# Patient Record
Sex: MALE | Race: WHITE | NOT HISPANIC OR LATINO | Employment: OTHER | URBAN - METROPOLITAN AREA
[De-identification: names, ages, dates, MRNs, and addresses within clinical notes are randomized per-mention and may not be internally consistent; named-entity substitution may affect disease eponyms.]

---

## 2017-02-03 ENCOUNTER — LAB REQUISITION (OUTPATIENT)
Dept: LAB | Facility: HOSPITAL | Age: 77
End: 2017-02-03
Payer: COMMERCIAL

## 2017-02-03 DIAGNOSIS — D64.9 ANEMIA: ICD-10-CM

## 2017-02-03 LAB — HEMOCCULT STL QL IA: NEGATIVE

## 2017-02-03 PROCEDURE — G0328 FECAL BLOOD SCRN IMMUNOASSAY: HCPCS | Performed by: FAMILY MEDICINE

## 2020-10-12 ENCOUNTER — APPOINTMENT (OUTPATIENT)
Dept: RADIOLOGY | Age: 80
End: 2020-10-12
Payer: COMMERCIAL

## 2020-10-12 ENCOUNTER — TRANSCRIBE ORDERS (OUTPATIENT)
Dept: ADMINISTRATIVE | Age: 80
End: 2020-10-12

## 2020-10-12 DIAGNOSIS — J01.90 ACUTE SINUSITIS, RECURRENCE NOT SPECIFIED, UNSPECIFIED LOCATION: ICD-10-CM

## 2020-10-12 DIAGNOSIS — J01.90 ACUTE SINUSITIS, RECURRENCE NOT SPECIFIED, UNSPECIFIED LOCATION: Primary | ICD-10-CM

## 2020-10-12 PROCEDURE — 70210 X-RAY EXAM OF SINUSES: CPT

## 2021-03-31 ENCOUNTER — TRANSCRIBE ORDERS (OUTPATIENT)
Dept: ADMINISTRATIVE | Age: 81
End: 2021-03-31

## 2021-03-31 ENCOUNTER — APPOINTMENT (OUTPATIENT)
Dept: RADIOLOGY | Age: 81
End: 2021-03-31
Payer: COMMERCIAL

## 2021-03-31 DIAGNOSIS — R05.9 COUGH: ICD-10-CM

## 2021-03-31 DIAGNOSIS — R05.9 COUGH: Primary | ICD-10-CM

## 2021-03-31 PROCEDURE — 71046 X-RAY EXAM CHEST 2 VIEWS: CPT

## 2021-07-12 ENCOUNTER — APPOINTMENT (OUTPATIENT)
Dept: RADIOLOGY | Age: 81
End: 2021-07-12
Payer: COMMERCIAL

## 2021-07-12 DIAGNOSIS — J45.40 MODERATE PERSISTENT ASTHMA, UNSPECIFIED WHETHER COMPLICATED: ICD-10-CM

## 2021-07-12 PROCEDURE — 71046 X-RAY EXAM CHEST 2 VIEWS: CPT

## 2023-01-27 ENCOUNTER — PROBLEM (OUTPATIENT)
Dept: URBAN - METROPOLITAN AREA CLINIC 6 | Facility: CLINIC | Age: 83
End: 2023-01-27

## 2023-01-27 DIAGNOSIS — G45.3: ICD-10-CM

## 2023-01-27 DIAGNOSIS — H26.491: ICD-10-CM

## 2023-01-27 DIAGNOSIS — R73.09: ICD-10-CM

## 2023-01-27 DIAGNOSIS — Z96.1: ICD-10-CM

## 2023-01-27 DIAGNOSIS — H43.813: ICD-10-CM

## 2023-01-27 PROCEDURE — 92250 FUNDUS PHOTOGRAPHY W/I&R: CPT

## 2023-01-27 PROCEDURE — 92014 COMPRE OPH EXAM EST PT 1/>: CPT

## 2023-01-27 ASSESSMENT — VISUAL ACUITY
OD_CC: 20/20
OU_CC: J1
OS_CC: 20/20

## 2023-01-27 ASSESSMENT — TONOMETRY
OS_IOP_MMHG: 9
OD_IOP_MMHG: 9

## 2023-04-20 ENCOUNTER — ESTABLISHED COMPREHENSIVE EXAM (OUTPATIENT)
Dept: URBAN - METROPOLITAN AREA CLINIC 6 | Facility: CLINIC | Age: 83
End: 2023-04-20

## 2023-04-20 DIAGNOSIS — Z96.1: ICD-10-CM

## 2023-04-20 DIAGNOSIS — H26.491: ICD-10-CM

## 2023-04-20 DIAGNOSIS — H43.813: ICD-10-CM

## 2023-04-20 DIAGNOSIS — R73.09: ICD-10-CM

## 2023-04-20 DIAGNOSIS — G45.3: ICD-10-CM

## 2023-04-20 PROCEDURE — 92014 COMPRE OPH EXAM EST PT 1/>: CPT

## 2023-04-20 ASSESSMENT — TONOMETRY
OD_IOP_MMHG: 8
OS_IOP_MMHG: 9

## 2023-04-20 ASSESSMENT — VISUAL ACUITY
OD_CC: 20/25-2
OS_CC: 20/25-2

## 2023-04-26 ENCOUNTER — PREP FOR PROCEDURE (OUTPATIENT)
Age: 83
End: 2023-04-26

## 2023-04-26 DIAGNOSIS — Z86.010 PERSONAL HISTORY OF COLONIC POLYPS: Primary | ICD-10-CM

## 2023-05-24 ENCOUNTER — ANESTHESIA (OUTPATIENT)
Dept: GASTROENTEROLOGY | Facility: HOSPITAL | Age: 83
End: 2023-05-24

## 2023-05-24 ENCOUNTER — ANESTHESIA EVENT (OUTPATIENT)
Dept: GASTROENTEROLOGY | Facility: HOSPITAL | Age: 83
End: 2023-05-24

## 2023-05-24 ENCOUNTER — HOSPITAL ENCOUNTER (OUTPATIENT)
Dept: GASTROENTEROLOGY | Facility: HOSPITAL | Age: 83
Setting detail: OUTPATIENT SURGERY
Discharge: HOME | End: 2023-05-24
Attending: COLON & RECTAL SURGERY

## 2023-05-24 VITALS
WEIGHT: 230 LBS | HEIGHT: 76 IN | DIASTOLIC BLOOD PRESSURE: 66 MMHG | TEMPERATURE: 96.4 F | HEART RATE: 70 BPM | RESPIRATION RATE: 16 BRPM | SYSTOLIC BLOOD PRESSURE: 138 MMHG | OXYGEN SATURATION: 97 % | BODY MASS INDEX: 28.01 KG/M2

## 2023-05-24 DIAGNOSIS — Z86.010 PERSONAL HISTORY OF COLONIC POLYPS: ICD-10-CM

## 2023-05-24 RX ORDER — FLUTICASONE FUROATE AND VILANTEROL 200; 25 UG/1; UG/1
1 POWDER RESPIRATORY (INHALATION) DAILY
COMMUNITY
Start: 2023-03-13

## 2023-05-24 RX ORDER — PROPOFOL 10 MG/ML
INJECTION, EMULSION INTRAVENOUS AS NEEDED
Status: DISCONTINUED | OUTPATIENT
Start: 2023-05-24 | End: 2023-05-24

## 2023-05-24 RX ORDER — SODIUM CHLORIDE 9 MG/ML
INJECTION, SOLUTION INTRAVENOUS CONTINUOUS PRN
Status: DISCONTINUED | OUTPATIENT
Start: 2023-05-24 | End: 2023-05-24

## 2023-05-24 RX ORDER — CARVEDILOL 6.25 MG/1
1 TABLET ORAL 2 TIMES DAILY WITH MEALS
COMMUNITY
Start: 2023-01-13 | End: 2024-01-13

## 2023-05-24 RX ORDER — FOLIC ACID 1 MG/1
5 TABLET ORAL
COMMUNITY
Start: 2023-01-31

## 2023-05-24 RX ORDER — ONDANSETRON 2 MG/ML
INJECTION INTRAMUSCULAR; INTRAVENOUS AS NEEDED
Status: DISCONTINUED | OUTPATIENT
Start: 2023-05-24 | End: 2023-05-24

## 2023-05-24 RX ORDER — MONTELUKAST SODIUM 10 MG/1
10 TABLET ORAL
COMMUNITY

## 2023-05-24 RX ORDER — SIMVASTATIN 40 MG
20 TABLET ORAL
COMMUNITY

## 2023-05-24 RX ORDER — LEVOTHYROXINE SODIUM 88 UG/1
88 TABLET ORAL
COMMUNITY
Start: 2022-07-31 | End: 2023-07-31

## 2023-05-24 RX ORDER — PANTOPRAZOLE SODIUM 40 MG/1
40 TABLET, DELAYED RELEASE ORAL
COMMUNITY
Start: 2022-09-25 | End: 2023-09-25

## 2023-05-24 RX ORDER — LISINOPRIL 5 MG/1
5 TABLET ORAL DAILY
COMMUNITY
Start: 2023-01-23

## 2023-05-24 RX ORDER — ACETAMINOPHEN 500 MG
500 TABLET ORAL
COMMUNITY

## 2023-05-24 RX ORDER — FUROSEMIDE 20 MG/1
40 TABLET ORAL DAILY
COMMUNITY
Start: 2023-01-23

## 2023-05-24 RX ADMIN — SODIUM CHLORIDE: 0.9 INJECTION, SOLUTION INTRAVENOUS at 11:25

## 2023-05-24 RX ADMIN — PROPOFOL 20 MG: 10 INJECTION, EMULSION INTRAVENOUS at 11:13

## 2023-05-24 RX ADMIN — PROPOFOL 20 MG: 10 INJECTION, EMULSION INTRAVENOUS at 11:06

## 2023-05-24 RX ADMIN — PROPOFOL 20 MG: 10 INJECTION, EMULSION INTRAVENOUS at 11:11

## 2023-05-24 RX ADMIN — PROPOFOL 20 MG: 10 INJECTION, EMULSION INTRAVENOUS at 11:15

## 2023-05-24 RX ADMIN — PROPOFOL 20 MG: 10 INJECTION, EMULSION INTRAVENOUS at 11:21

## 2023-05-24 RX ADMIN — PROPOFOL 20 MG: 10 INJECTION, EMULSION INTRAVENOUS at 11:07

## 2023-05-24 RX ADMIN — PROPOFOL 20 MG: 10 INJECTION, EMULSION INTRAVENOUS at 11:09

## 2023-05-24 RX ADMIN — ONDANSETRON 4 MG: 2 INJECTION INTRAMUSCULAR; INTRAVENOUS at 11:23

## 2023-05-24 RX ADMIN — SODIUM CHLORIDE: 0.9 INJECTION, SOLUTION INTRAVENOUS at 10:57

## 2023-05-24 RX ADMIN — PROPOFOL 80 MG: 10 INJECTION, EMULSION INTRAVENOUS at 11:04

## 2023-05-24 RX ADMIN — PROPOFOL 20 MG: 10 INJECTION, EMULSION INTRAVENOUS at 11:17

## 2023-05-24 NOTE — ANESTHESIA POSTPROCEDURE EVALUATION
Post-Op Assessment Note    CV Status:  Stable  Pain Score: 0    Pain management: satisfactory to patient     Mental Status:  Awake   Hydration Status:  Stable   PONV Controlled:  None   Airway Patency:  Patent      Post Op Vitals Reviewed: Yes      Staff: CRNA         No notable events documented      /54 (05/24/23 1130)    Temp (!) 96 4 °F (35 8 °C) (05/24/23 1130)    Pulse 69 (05/24/23 1130)   Resp 14    SpO2 99 % (05/24/23 1130)

## 2023-05-24 NOTE — ANESTHESIA PREPROCEDURE EVALUATION
Procedure:  COLONOSCOPY    Relevant Problems   ANESTHESIA (within normal limits)      CARDIO   (+) A-fib (HCC)   (+) Hypertension      ENDO   (+) Hypothyroid      GI/HEPATIC  NPO confirmed  BMI 28   (+) Gastroesophageal reflux disease      /RENAL (within normal limits)      PULMONARY   (+) Asthma   (+) Sleep apnea      Nervous and Auditory   (+) Neuropathy      Other   (+) Borderline diabetes   (+) History of transcatheter aortic valve replacement (TAVR)   VVI pacer 23    Allergies   Allergen Reactions   • Cat Hair Extract Sneezing   • Dog Epithelium Sneezing   • Other Sneezing     perfume   • Pollen Extract Sneezing   • Tree Extract Sneezing     Social History     Tobacco Use   • Smoking status: Former     Types: Cigarettes     Quit date:      Years since quittin 4   • Smokeless tobacco: Never   Substance Use Topics   • Alcohol use: Yes     Comment: occasionally   • Drug use: Never     Current Outpatient Medications   Medication Instructions   • acetaminophen (TYLENOL) 500 mg, Oral   • Albuterol Sulfate 108 (90 Base) MCG/ACT AEPB Inhalation   • apixaban (ELIQUIS) 2 5 mg, Oral, 2 times daily   • carvedilol (COREG) 6 25 mg tablet 1 tablet, Oral, 2 times daily with meals   • fluticasone-vilanterol (Breo Ellipta) 200-25 mcg/actuation inhaler 1 puff, Inhalation, Daily   • folic acid (FOLVITE) 5 mg   • furosemide (LASIX) 40 mg, Oral, Daily   • levothyroxine 88 mcg, Oral   • lisinopril (ZESTRIL) 5 mg, Oral, Daily   • montelukast (SINGULAIR) 10 mg, Oral   • pantoprazole (PROTONIX) 40 mg, Oral   • simvastatin (ZOCOR) 20 mg, Oral     Lab Results   Component Value Date    HGBA1C 5 4 2023     Vitals:    23 1024   BP: 168/72   Pulse: 81   Resp: 20   Temp: 97 8 °F (36 6 °C)   SpO2: 99%     Echo 22   Left Ventricle: There is mild concentric hypertrophy  Systolic function   is normal with an ejection fraction of 60-65%  Wall motion is within   normal limits   There is grade I (mild) diastolic dysfunction and normal   left atrial pressure  •  Left Atrium: Left atrium cavity is mildly dilated  Left atrium volume   index is normal    •  Aortic Valve: There is mild sclerosis  •  Tricuspid Valve: There is trace regurgitation  The right ventricular   systolic pressure is normal       Carotid U/S 4/25/23  Impression:   1  Patent carotid repair, less than 50% right internal carotid artery stenosis   2  Patent carotid stent, less than 50% left internal carotid artery stenosis   3  Normally directed vertebral flow bilaterally  Physical Exam    Airway    Mallampati score: II  TM Distance: >3 FB  Neck ROM: full     Dental   Comment: Denies loose/chipped teeth, No notable dental hx     Cardiovascular  Rhythm: regular, Rate: normal, Cardiovascular exam normal    Pulmonary  Pulmonary exam normal Breath sounds clear to auscultation,     Other Findings        Anesthesia Plan  ASA Score- 3     Anesthesia Type- IV sedation with anesthesia with ASA Monitors  Additional Monitors:   Airway Plan:     Comment: O2 mask, natural airway, EtCO2 monitor  Plan Factors-Exercise tolerance (METS): >4 METS  Chart reviewed  EKG reviewed  Existing labs reviewed  Patient summary reviewed  Patient is not a current smoker  Induction- intravenous  Postoperative Plan-     Informed Consent- Anesthetic plan and risks discussed with patient  I personally reviewed this patient with the CRNA  Discussed and agreed on the Anesthesia Plan with the CRNA  Stephen Abraham

## 2023-05-24 NOTE — H&P
History and Physical   Colon and Rectal Surgery   Shayy Parmar 80 y o  male MRN: [id number]  Unit/Bed#:  Encounter: [id number]  05/24/23   10:52 AM      CC:  History of polyps  History of Present Illness   HPI:  Shayy Parmar is a 80 y o  male with mild difficulty with evacuation of stool  He had sessile serrated adenoma 5 years ago    Historical Information   Past Medical History:   Diagnosis Date   • A-fib ([facility] [address] )    • Asthma    • Basal cell carcinoma     on left ear - surgically removed   • Borderline diabetes    • Cataract     bilateral surgery   • History of transcatheter aortic valve replacement (TAVR)    • Hypertension    • Hypothyroid    • Neuropathy    • Sleep apnea     cpap use at night     Past Surgical History:   Procedure Laterality Date   • BACK SURGERY  2003   • CAROTID ARTERY ANGIOPLASTY     • CATARACT EXTRACTION, BILATERAL     • VASECTOMY         Meds/Allergies     (Not in a hospital admission)        Current Outpatient Medications:   •  Albuterol Sulfate 108 (90 Base) MCG/ACT AEPB, Inhale, Disp: , Rfl:   •  apixaban (ELIQUIS) 2 5 mg, Take 2 5 mg by mouth 2 (two) times a day, Disp: , Rfl:   •  carvedilol (COREG) 6 25 mg tablet, Take 1 tablet by mouth 2 (two) times a day with meals, Disp: , Rfl:   •  fluticasone-vilanterol (Breo Ellipta) 200-25 mcg/actuation inhaler, Inhale 1 puff daily, Disp: , Rfl:   •  folic acid (FOLVITE) 1 mg tablet, 5 mg, Disp: , Rfl:   •  furosemide (LASIX) 20 mg tablet, Take 40 mg by mouth daily, Disp: , Rfl:   •  levothyroxine 88 mcg tablet, Take 88 mcg by mouth, Disp: , Rfl:   •  lisinopril (ZESTRIL) 5 mg tablet, Take 5 mg by mouth daily, Disp: , Rfl:   •  montelukast (SINGULAIR) 10 mg tablet, Take 10 mg by mouth, Disp: , Rfl:   •  pantoprazole (PROTONIX) 40 mg tablet, Take 40 mg by mouth, Disp: , Rfl:   •  simvastatin (ZOCOR) 40 mg tablet, Take 20 mg by mouth, Disp: , Rfl:   •  acetaminophen (TYLENOL) 500 mg tablet, Take 500 mg by mouth, Disp: , Rfl:     Allergies   Allergen "Reactions   • Cat Hair Extract Sneezing   • Dog Epithelium Sneezing   • Other Sneezing     perfume   • Pollen Extract Sneezing   • Tree Extract Sneezing         Social History   Social History     Substance and Sexual Activity   Alcohol Use Yes    Comment: occasionally     Social History     Substance and Sexual Activity   Drug Use Never     Social History     Tobacco Use   Smoking Status Former   • Types: Cigarettes   • Quit date:    • Years since quittin 4   Smokeless Tobacco Never         Family History: History reviewed  No pertinent family history  Objective     Current Vitals:   Blood Pressure: 168/72 (23 1024)  Pulse: 81 (23 1024)  Temperature: 97 8 °F (36 6 °C) (23 1024)  Temp Source: Tympanic (23 1024)  Respirations: 20 (23 1024)  Height: 6' 4\" (193 cm) (23 1024)  Weight - Scale: 104 kg (230 lb) (23 1024)  SpO2: 99 % (23 1024)  No intake or output data in the 24 hours ending 23 1052    Physical Exam:  General:  Well nourished, no distress  Neuro: Alert and oriented  Eyes:Sclera anicteric, conjunctiva pink  Pulm: Clear to auscultation bilaterally  No respiratory Distress  CV:  Regular rate and rhythm  No murmurs  Abdomen:  Soft, flat, non-tender, without masses or hepatosplenomegaly  Lab Results:       ASSESSMENT:  Nakia Perez is a 80 y o  male for surveillance  PLAN:  Colonoscopy  Risks , including, but not limited to, bleeding, perforation, missed lesions, and potential need for surgery, were reviewed  Alternatives to colonoscopy were discussed    Freda Gonsalez MD  "

## 2024-04-23 ENCOUNTER — ESTABLISHED COMPREHENSIVE EXAM (OUTPATIENT)
Dept: URBAN - METROPOLITAN AREA CLINIC 6 | Facility: CLINIC | Age: 84
End: 2024-04-23

## 2024-04-23 DIAGNOSIS — H43.813: ICD-10-CM

## 2024-04-23 DIAGNOSIS — H04.123: ICD-10-CM

## 2024-04-23 DIAGNOSIS — R73.09: ICD-10-CM

## 2024-04-23 DIAGNOSIS — H26.491: ICD-10-CM

## 2024-04-23 DIAGNOSIS — Z96.1: ICD-10-CM

## 2024-04-23 PROCEDURE — 92014 COMPRE OPH EXAM EST PT 1/>: CPT

## 2024-04-23 ASSESSMENT — TONOMETRY
OD_IOP_MMHG: 10
OS_IOP_MMHG: 10

## 2024-04-23 ASSESSMENT — VISUAL ACUITY
OS_CC: J1
OD_CC: J1
OS_CC: 20/25
OD_CC: 20/20